# Patient Record
(demographics unavailable — no encounter records)

---

## 2024-12-06 NOTE — PLAN
[FreeTextEntry1] : Continue all medications as prescribed. Check labs as above. He had breakfast so he is not fasting. Will adjust any medications based upon lab results.  Preventive testing is no longer indicated based upon his age.  Discussed clean eating (eg Mediterranean style eating plan) and regular exercise/staying as physically active as possible. Include balance exercises and strength training and core strengthening exercises for bone health and to decrease risk for falls.  Reviewed importance of good self care (e.g. meditation, yoga, adequate rest, regular exercise, magnesium, clean eating, etc.).  Follow up for next physical in one year. Follow up sooner if needed.

## 2024-12-06 NOTE — HISTORY OF PRESENT ILLNESS
[Other: _____] : [unfilled] [FreeTextEntry1] : EMILIANA MENDEZ is a 96 year old male here for a physical exam. [de-identified] : His last physical exam was last year  Vaccines: Tetanus is NOT up to date Pneumococcal vaccination is up to date Shingrix is NOT up to date  His last dentist visit was less than one year ago His last eye doctor appointment was less than one year ago His last dermatologist visit was less than one year ago  Colon cancer screening is no longer indicated for this age  His diet is healthy overall Exercise: rarely

## 2024-12-06 NOTE — HISTORY OF PRESENT ILLNESS
[Other: _____] : [unfilled] [FreeTextEntry1] : EMILIANA MENDEZ is a 96 year old male here for a physical exam. [de-identified] : His last physical exam was last year  Vaccines: Tetanus is NOT up to date Pneumococcal vaccination is up to date Shingrix is NOT up to date  His last dentist visit was less than one year ago His last eye doctor appointment was less than one year ago His last dermatologist visit was less than one year ago  Colon cancer screening is no longer indicated for this age  His diet is healthy overall Exercise: rarely

## 2024-12-06 NOTE — HEALTH RISK ASSESSMENT
[No falls in past year] : Patient reported no falls in the past year [0] : 2) Feeling down, depressed, or hopeless: Not at all (0) [PHQ-2 Negative - No further assessment needed] : PHQ-2 Negative - No further assessment needed [Never] : Never [[id number]] : 0

## 2024-12-06 NOTE — PHYSICAL EXAM
[No Edema] : there was no peripheral edema [Soft] : abdomen soft [Non Tender] : non-tender [No Spinal Tenderness] : no spinal tenderness [Grossly Normal Strength/Tone] : grossly normal strength/tone [No Rash] : no rash [Coordination Grossly Intact] : coordination grossly intact [No Focal Deficits] : no focal deficits [Normal Gait] : normal gait [Normal] : affect was normal and insight and judgment were intact [de-identified] : large right inguinal hernia

## 2024-12-06 NOTE — HEALTH RISK ASSESSMENT
[No falls in past year] : Patient reported no falls in the past year [0] : 2) Feeling down, depressed, or hopeless: Not at all (0) [PHQ-2 Negative - No further assessment needed] : PHQ-2 Negative - No further assessment needed [Never] : Never [[id number]] : 0

## 2024-12-06 NOTE — REVIEW OF SYSTEMS
[Fatigue] : fatigue [Constipation] : constipation [Muscle Weakness] : muscle weakness [Unsteady Walk] : ataxia [Negative] : Heme/Lymph [FreeTextEntry7] : right inguinal hernia

## 2024-12-06 NOTE — ASSESSMENT
[FreeTextEntry1] : EMILIANA MENDEZ is a 96 year old male here for a physical exam.  He has a history of hypertension treated with medication and hypercholesterolemia controlled with diet.  He has a large right inguinal hernia. He saw Dr. Jaramillo who told him that he did not need surgery for this since he has no pain.  He has been losing weight over the past year. He reports a decreased appetite. He has lost muscle mass as well. He admits that he does very little exercise. He was better about exercicising when he was getting home PT but his benefits ran out for the year. He plans to resume home PT in January.  His blood pressure is low in the office today. He may not need as much blood pressure medication since he has lost weight. His aide will check his BP at home and will cut the Metoprolol from 100 to 50 mg daily if his sytolic is under 120.  He reports double vision which prevents him from watching television. He mentioned this to his ophalmologist who referred him to a specialist (a neuroophthalmologist?). His appointment is not until next year however and they are trying to get him seen sooner.

## 2024-12-06 NOTE — PHYSICAL EXAM
[No Edema] : there was no peripheral edema [Soft] : abdomen soft [Non Tender] : non-tender [No Spinal Tenderness] : no spinal tenderness [Grossly Normal Strength/Tone] : grossly normal strength/tone [No Rash] : no rash [Coordination Grossly Intact] : coordination grossly intact [No Focal Deficits] : no focal deficits [Normal Gait] : normal gait [Normal] : affect was normal and insight and judgment were intact [de-identified] : large right inguinal hernia

## 2025-06-02 NOTE — PHYSICAL EXAM
[Normal Appearance] : normal appearance [Well Groomed] : well groomed [General Appearance - In No Acute Distress] : no acute distress [Edema] : no peripheral edema [Respiration, Rhythm And Depth] : normal respiratory rhythm and effort [Exaggerated Use Of Accessory Muscles For Inspiration] : no accessory muscle use [Abdomen Soft] : soft [Abdomen Tenderness] : non-tender [Costovertebral Angle Tenderness] : no ~M costovertebral angle tenderness [Urinary Bladder Findings] : the bladder was normal on palpation [Normal Station and Gait] : the gait and station were normal for the patient's age [] : no rash [No Focal Deficits] : no focal deficits [Oriented To Time, Place, And Person] : oriented to person, place, and time [Affect] : the affect was normal [Mood] : the mood was normal [No Palpable Adenopathy] : no palpable adenopathy [de-identified] : normal peripheral circulation  [de-identified] : Wheelchair-bound [de-identified] : Awake, follows commands

## 2025-06-02 NOTE — END OF VISIT
[FreeTextEntry3] : Patient was seen with Physician assistant and examined by me. Agree with above note, where necessary edits were made. Parts of this note were generated by using Dragon medical dictation software. A reasonable effort was made to proofread and correct its contents, but typos and mistakes may still remain. If there are any questions or points of clarification needed, please contact my office.   Prior to appointment and during encounter with patient extensive medical records were reviewed including but not limited to, hospital records, outpatient records, imaging results and lab data if available. During this appointment the patient was examined, diagnoses were discussed and explained in a face-to-face manner. In addition, extensive time was spent reviewing aforementioned diagnostic studies. Counseling including test results, differential diagnoses, treatment options, risk and benefits, lifestyle changes, current condition, and current medications was performed. Patient's questions and concerns were addressed. Patient verbalized understanding of the treatment plan. Time spent is for reviewing chart, labs and images if available, counseling and care coordination.  [Time Spent: ___ minutes] : I have spent [unfilled] minutes of time on the encounter which excludes teaching and separately reported services.

## 2025-06-02 NOTE — HISTORY OF PRESENT ILLNESS
[FreeTextEntry1] : 96-year-old male presents with his aide with complaint of urinary frequency and recent UTI. Spoke to patient and aide present with him as well as patient's daughter Pushpa on the phone: [phone]. Patient's aide reports patient has urinary frequency and intermittent incontinence which was worse over the past 2 weeks. Patient was seen at an urgent care and prescribed Cephalexin for 1 week and finished his antibiotic course 1 week ago. Patient with improved incontinence but as per aide and daughter patient continues to have intermittent confusion and was complaining of chest pain overnight which has since resolved. Vitals stable in the office. They deny any fevers, dysuria, hematuria or other symptoms in patient. As per records urine culture in the past and even 2 weeks ago is noted to be positive for Pseudomonas.  Reports slow stream, urinates every few hours or so during the day at baseline. Nocturia of 2 x.  Endorses urge incontinence, frequency and urgency.  Denies hesitancy, straining, sense of incomplete emptying. Denies dysuria, hematuria, lower abdominal or flank pain, fever, chills or rigors.

## 2025-06-02 NOTE — HISTORY OF PRESENT ILLNESS
[FreeTextEntry1] : 96-year-old male presents with his aide with complaint of urinary frequency and recent UTI. Spoke to patient and aide present with him as well as patient's daughter Pushpa on the phone: [phone]. Patient's aide reports patient has urinary frequency and intermittent incontinence which was worse over the past 2 weeks. Patient was seen at an urgent care and prescribed Cephalexin for 1 week and finished his antibiotic course 1 week ago. Patient with improved incontinence but as per aide and daughter patient continues to have intermittent confusion and was complaining of chest pain overnight which has since resolved. Vitals stable in the office. They deny any fevers, dysuria, hematuria or other symptoms in patient. As per records urine culture in the past and even 2 weeks ago is noted to be positive for Pseudomonas.  Reports slow stream, urinates every few hours or so during the day at baseline. Nocturia of 2 x.  Endorses urge incontinence, frequency and urgency.  Denies hesitancy, straining, sense of incomplete emptying. Denies dysuria, hematuria, lower abdominal or flank pain, fever, chills or rigors.

## 2025-06-02 NOTE — PHYSICAL EXAM
[Normal Appearance] : normal appearance [Well Groomed] : well groomed [General Appearance - In No Acute Distress] : no acute distress [Edema] : no peripheral edema [Respiration, Rhythm And Depth] : normal respiratory rhythm and effort [Exaggerated Use Of Accessory Muscles For Inspiration] : no accessory muscle use [Abdomen Soft] : soft [Abdomen Tenderness] : non-tender [Costovertebral Angle Tenderness] : no ~M costovertebral angle tenderness [Urinary Bladder Findings] : the bladder was normal on palpation [Normal Station and Gait] : the gait and station were normal for the patient's age [] : no rash [No Focal Deficits] : no focal deficits [Oriented To Time, Place, And Person] : oriented to person, place, and time [Affect] : the affect was normal [Mood] : the mood was normal [No Palpable Adenopathy] : no palpable adenopathy [de-identified] : normal peripheral circulation  [de-identified] : Wheelchair-bound [de-identified] : Awake, follows commands

## 2025-06-02 NOTE — REVIEW OF SYSTEMS
[Negative] : Heme/Lymph [FreeTextEntry1] : not able to obtain review of system due to Patient condition

## 2025-06-02 NOTE — ASSESSMENT
[FreeTextEntry1] : Benign Prostatic Hyperplasia: Urine frequency/ urgency and urge incontinence: Bladder scan: 162 ml.  Urinated 40 minutes ago. Discussed that he has LUTs which probably are secondary to enlarged prostate and that I would like to address those first with alpha blockers. Patient agreeable. Prescribed Silodosin. Explained common side effects: nasal congestion, cough, muscle pain, back pain, dizziness, orthostatic hypotension, somnolence, retrograde ejaculation, decreased libido and erectile dysfunction. Discussed with patient's daughter to monitor closesly initailly for dizziness and orthostatic hypotension. Do not take with other blood pressure medications.  Recurrent and Acute Urinary tract infection: Most recent urine culture Pseudomonas. Will obtain urinalysis and urine culture. Will inform results Will prescribe Levofloxacin and adjust as per culture.  Discussed FDA black box warning. Will prescribe Hiprex to be started 3 days after completion of antibiotic course. Will obtain Renal bladder US. Will inform results.  Discussed given the confusion and recent chest pain which has now resolved recommend patient to be transferred to the ED for further evaluation to rule out sepsis/ cardiac event. At this time patient's daughter and aide feel patient is at baseline and do not wish to take him to the ED. They will monitor and take him to the ED if any symptoms return.  Will inform results.  Return to office in 3 months or sooner if any issues.